# Patient Record
Sex: MALE | Race: OTHER | HISPANIC OR LATINO | URBAN - METROPOLITAN AREA
[De-identification: names, ages, dates, MRNs, and addresses within clinical notes are randomized per-mention and may not be internally consistent; named-entity substitution may affect disease eponyms.]

---

## 2022-11-03 ENCOUNTER — EMERGENCY (EMERGENCY)
Facility: HOSPITAL | Age: 32
LOS: 1 days | Discharge: HOME | End: 2022-11-03
Admitting: EMERGENCY MEDICINE
Payer: SELF-PAY

## 2022-11-03 VITALS
HEART RATE: 75 BPM | OXYGEN SATURATION: 98 % | SYSTOLIC BLOOD PRESSURE: 131 MMHG | HEIGHT: 60 IN | RESPIRATION RATE: 16 BRPM | WEIGHT: 160.06 LBS | DIASTOLIC BLOOD PRESSURE: 85 MMHG | TEMPERATURE: 98 F
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
## 2022-11-03 DIAGNOSIS — Z23 ENCOUNTER FOR IMMUNIZATION: ICD-10-CM

## 2022-11-03 DIAGNOSIS — S00.83XA CONTUSION OF OTHER PART OF HEAD, INITIAL ENCOUNTER: ICD-10-CM

## 2022-11-03 DIAGNOSIS — S02.5XXA FRACTURE OF TOOTH (TRAUMATIC), INITIAL ENCOUNTER FOR CLOSED FRACTURE: ICD-10-CM
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
## 2022-11-03 DIAGNOSIS — M25.562 PAIN IN LEFT KNEE: ICD-10-CM

## 2022-11-03 DIAGNOSIS — S00.511A ABRASION OF LIP, INITIAL ENCOUNTER: ICD-10-CM
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
## 2022-11-03 DIAGNOSIS — S60.511A ABRASION OF RIGHT HAND, INITIAL ENCOUNTER: ICD-10-CM

## 2022-11-03 DIAGNOSIS — M25.532 PAIN IN LEFT WRIST: ICD-10-CM

## 2022-11-03 DIAGNOSIS — S60.512A ABRASION OF LEFT HAND, INITIAL ENCOUNTER: ICD-10-CM

## 2022-11-03 DIAGNOSIS — R51.9 HEADACHE, UNSPECIFIED: ICD-10-CM

## 2022-11-03 DIAGNOSIS — S02.2XXA FRACTURE OF NASAL BONES, INITIAL ENCOUNTER FOR CLOSED FRACTURE: ICD-10-CM
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
## 2022-11-03 PROCEDURE — 73110 X-RAY EXAM OF WRIST: CPT | Mod: 26,LT

## 2022-11-03 PROCEDURE — 73564 X-RAY EXAM KNEE 4 OR MORE: CPT | Mod: 26,LT

## 2022-11-03 PROCEDURE — 99285 EMERGENCY DEPT VISIT HI MDM: CPT | Mod: 25

## 2022-11-03 PROCEDURE — 73130 X-RAY EXAM OF HAND: CPT | Mod: 26

## 2022-11-03 PROCEDURE — 70450 CT HEAD/BRAIN W/O DYE: CPT | Mod: 26,MA

## 2022-11-03 PROCEDURE — 73564 X-RAY EXAM KNEE 4 OR MORE: CPT | Mod: 26

## 2022-11-03 PROCEDURE — 70486 CT MAXILLOFACIAL W/O DYE: CPT | Mod: 26,MA

## 2022-11-03 PROCEDURE — 73110 X-RAY EXAM OF WRIST: CPT | Mod: 26

## 2022-11-03 PROCEDURE — 73130 X-RAY EXAM OF HAND: CPT | Mod: 26,LT

## 2022-11-03 RX ORDER — TETANUS TOXOID, REDUCED DIPHTHERIA TOXOID AND ACELLULAR PERTUSSIS VACCINE, ADSORBED 5; 2.5; 8; 8; 2.5 [IU]/.5ML; [IU]/.5ML; UG/.5ML; UG/.5ML; UG/.5ML
0.5 SUSPENSION INTRAMUSCULAR ONCE
Refills: 0 | Status: COMPLETED | OUTPATIENT
Start: 2022-11-03 | End: 2022-11-03

## 2022-11-03 RX ORDER — ACETAMINOPHEN 500 MG
1000 TABLET ORAL ONCE
Refills: 0 | Status: COMPLETED | OUTPATIENT
Start: 2022-11-03 | End: 2022-11-03

## 2022-11-03 RX ADMIN — Medication 1000 MILLIGRAM(S): at 21:50

## 2022-11-03 RX ADMIN — Medication 1000 MILLIGRAM(S): at 22:20

## 2022-11-03 RX ADMIN — TETANUS TOXOID, REDUCED DIPHTHERIA TOXOID AND ACELLULAR PERTUSSIS VACCINE, ADSORBED 0.5 MILLILITER(S): 5; 2.5; 8; 8; 2.5 SUSPENSION INTRAMUSCULAR at 21:50

## 2022-11-03 NOTE — ED PROVIDER NOTE - PHYSICAL EXAMINATION
CONSTITUTIONAL: Well-appearing;  in no apparent distress.   HEAD: Normocephalic; atraumatic.   EYES: PERRL; EOM intact; conjunctiva and sclera clear  ENT: +abrasion in between eyebrows, nasal bridge, upper lips swelling. +loose upper teeth. No jaw tenderness   NECK: Supple; non-tender;   CARDIOVASCULAR: rrr,  RESPIRATORY: Breathing easily;   MSK: L wrist + tenderness over thenar eminence. No snuffbox tenderness. L knee- +  tenderness, FROM. +abrasions to b/l hands   EXT: No cyanosis or edema; N/V intact  SKIN: Normal for age and race; warm; dry; good turgor; no apparent lesions or rash.  neuro: AAO x 3, CN II-XII intact, normal speech, strength 5/5 bilateral upper and lower extremities, sensation intact, cerebellum intact, no ataxia, follows commands appropriately
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
## 2022-11-03 NOTE — ED PROVIDER NOTE - NSFOLLOWUPINSTRUCTIONS_ED_ALL_ED_FT
Contusión    Contusion      Kathy contusión es un hematoma profundo. Es el resultado de kathy lesión que causa sangrado debajo de la piel. Los síntomas de hematoma incluyen dolor, hinchazón y cambio de color en la piel. La piel puede ponerse sally, morada o amarilla.      Siga estas indicaciones en parmar casa:      Control del dolor, el entumecimiento y la hinchazón      Puede usar RHCE. [place] significa:  •Hacer reposo.      •Aplicar hielo.      •Aplicar presión, o compresión.      •Poner en alto, o elevar, la cortney lesionada.      Para seguir liz método, isiah lo siguiente:  •Mantenga la cortney de la lesión en reposo.    •Aplique hielo sobre la cortney lesionada, si se lo indican.  •Ponga el hielo en kathy bolsa plástica.      •Coloque kathy toalla entre la piel y la bolsa.      •Coloque el hielo ronit 20 minutos, 2 a 3 veces al día.        •Si se lo indican, ejerza kathy presión suave (compresión) en la cortney de la lesión con kathy venda elástica. Asegúrese de que la venda no esté muy ajustada. Si siente hormigueo o adormecimiento en la cortney, quítesela y vuelva a colocarla katharine se lo haya indicado el médico.      •Si es posible, cuando esté sentado o acostado, levante (eleve) la cortney lesionada por encima del nivel del corazón.      Indicaciones generales     •Tatums los medicamentos de venta antonino y los recetados solamente katharine se lo haya indicado el médico.      •Concurra a todas las visitas de control katharine se lo haya indicado el médico. [place] es importante.        Comuníquese con un médico si:    •Los síntomas no mejoran después de varios días de tratamiento.      •Janet síntomas empeoran.      •Tiene dificultad para  la cortney de la lesión.        Solicite ayuda inmediatamente si:    •Siente mucho dolor.      •Pierde la sensibilidad (adormecimiento) en kathy mano o un pie.      •La mano o el pie están pálidos o fríos.        Resumen    •Kathy contusión es un hematoma profundo. Es el resultado de kathy lesión que causa sangrado debajo de la piel.      •Los síntomas de hematoma incluyen dolor, hinchazón y cambio de color en la piel. La piel puede ponerse sally, morada o amarilla.      •El tratamiento para esta afección incluye hacer reposo, aplicarse hielo, compresión y elevar la cortney afectada. [place] también se denomina RHCE. Es posible que le den analgésicos de venta antonino.      •Comuníquese con un médico si no se siente mejor o si se siente peor. Solicite ayuda de inmediato si tiene dolor muy intenso, si pierde la sensibilidad en kathy mano o en un pie, o si la cortney se torna pálida o fría.      Esta información no tiene katharine fin reemplazar el consejo del médico. Asegúrese de hacerle al médico cualquier pregunta que tenga.

## 2022-11-03 NOTE — ED ADULT NURSE NOTE - CAS EDP DISCH TYPE
Patient seen by Nabor. Cleared medically for DC. Instructions provided by ED provider. All questions addressed at this time. DC home with paperwork   Home

## 2022-11-03 NOTE — ED PROVIDER NOTE - PATIENT PORTAL LINK FT
You can access the FollowMyHealth Patient Portal offered by [facility] by registering at the following website: [email or web address]. By joining [facility]’s FollowMyHealth portal, you will also be able to view your health information using other applications (apps) compatible with our system.
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
## 2022-11-04 VITALS
HEART RATE: 80 BPM | RESPIRATION RATE: 17 BRPM | OXYGEN SATURATION: 99 % | SYSTOLIC BLOOD PRESSURE: 122 MMHG | DIASTOLIC BLOOD PRESSURE: 82 MMHG | TEMPERATURE: 98 F

## 2022-11-04 PROCEDURE — 70450 CT HEAD/BRAIN W/O DYE: CPT | Mod: MA

## 2022-11-04 PROCEDURE — 73564 X-RAY EXAM KNEE 4 OR MORE: CPT

## 2022-11-04 PROCEDURE — 90471 IMMUNIZATION ADMIN: CPT

## 2022-11-04 PROCEDURE — 99284 EMERGENCY DEPT VISIT MOD MDM: CPT | Mod: 25

## 2022-11-04 PROCEDURE — 73130 X-RAY EXAM OF HAND: CPT

## 2022-11-04 PROCEDURE — 73110 X-RAY EXAM OF WRIST: CPT

## 2022-11-04 PROCEDURE — 90715 TDAP VACCINE 7 YRS/> IM: CPT

## 2022-11-04 PROCEDURE — 70486 CT MAXILLOFACIAL W/O DYE: CPT | Mod: MA
